# Patient Record
Sex: FEMALE | Race: WHITE
[De-identification: names, ages, dates, MRNs, and addresses within clinical notes are randomized per-mention and may not be internally consistent; named-entity substitution may affect disease eponyms.]

---

## 2019-06-02 ENCOUNTER — HOSPITAL ENCOUNTER (EMERGENCY)
Dept: HOSPITAL 91 - FTE | Age: 19
Discharge: HOME | End: 2019-06-02
Payer: SELF-PAY

## 2019-06-02 ENCOUNTER — HOSPITAL ENCOUNTER (EMERGENCY)
Dept: HOSPITAL 10 - FTE | Age: 19
Discharge: HOME | End: 2019-06-02
Payer: SELF-PAY

## 2019-06-02 VITALS
WEIGHT: 131.18 LBS | HEIGHT: 65 IN | BODY MASS INDEX: 21.85 KG/M2 | HEIGHT: 65 IN | WEIGHT: 131.18 LBS | BODY MASS INDEX: 21.85 KG/M2

## 2019-06-02 VITALS — SYSTOLIC BLOOD PRESSURE: 121 MMHG | RESPIRATION RATE: 18 BRPM | DIASTOLIC BLOOD PRESSURE: 80 MMHG | HEART RATE: 102 BPM

## 2019-06-02 DIAGNOSIS — O21.9: Primary | ICD-10-CM

## 2019-06-02 DIAGNOSIS — Z3A.12: ICD-10-CM

## 2019-06-02 LAB
ABNORMAL IP MESSAGE: 1
ADD MAN DIFF?: NO
ADD UMIC: YES
ANION GAP: 11 (ref 5–13)
BASOPHIL #: 0 10^3/UL (ref 0–0.1)
BASOPHILS %: 0.2 % (ref 0–2)
BLOOD UREA NITROGEN: 8 MG/DL (ref 7–20)
CALCIUM: 9.4 MG/DL (ref 8.4–10.2)
CARBON DIOXIDE: 25 MMOL/L (ref 21–31)
CHLORIDE: 102 MMOL/L (ref 97–110)
CREATININE: 0.61 MG/DL (ref 0.44–1)
EOSINOPHILS #: 0 10^3/UL (ref 0–0.5)
EOSINOPHILS %: 0 % (ref 0–7)
GLUCOSE: 85 MG/DL (ref 70–220)
HEMATOCRIT: 37.6 % (ref 37–47)
HEMOGLOBIN: 13 G/DL (ref 12–16)
IMMATURE GRANS #M: 0.02 10^3/UL (ref 0–0.03)
IMMATURE GRANS % (M): 0.4 % (ref 0–0.43)
LYMPHOCYTES #: 0.5 10^3/UL (ref 0.8–2.9)
LYMPHOCYTES %: 10 % (ref 18–55)
MEAN CORPUSCULAR HEMOGLOBIN: 29.1 PG (ref 29–33)
MEAN CORPUSCULAR HGB CONC: 34.6 G/DL (ref 32–37)
MEAN CORPUSCULAR VOLUME: 84.1 FL (ref 72–104)
MEAN PLATELET VOLUME: 9.6 FL (ref 7.4–10.4)
MONOCYTE #: 0.8 10^3/UL (ref 0.3–0.9)
MONOCYTES %: 13.9 % (ref 0–13)
NEUTROPHIL #: 4.1 10^3/UL (ref 1.6–7.5)
NEUTROPHILS %: 75.5 % (ref 30–74)
NUCLEATED RED BLOOD CELLS #: 0 10^3/UL (ref 0–0)
NUCLEATED RED BLOOD CELLS%: 0 /100WBC (ref 0–0)
PLATELET COUNT: 195 10^3/UL (ref 140–415)
POSITIVE DIFF: (no result)
POTASSIUM: 4 MMOL/L (ref 3.5–5.1)
RED BLOOD COUNT: 4.47 10^6/UL (ref 4.2–5.4)
RED CELL DISTRIBUTION WIDTH: 13.4 % (ref 11.5–14.5)
SODIUM: 138 MMOL/L (ref 135–144)
UR ASCORBIC ACID: NEGATIVE MG/DL
UR BACTERIA: (no result) /HPF
UR BILIRUBIN (DIP): NEGATIVE MG/DL
UR BLOOD (DIP): NEGATIVE MG/DL
UR CLARITY: (no result)
UR COLOR: (no result)
UR GLUCOSE (DIP): NEGATIVE MG/DL
UR KETONES (DIP): (no result) MG/DL
UR LEUKOCYTE ESTERASE (DIP): (no result) LEU/UL
UR MUCUS: (no result) /HPF
UR NITRITE (DIP): NEGATIVE MG/DL
UR NONSQUAMOUS EPITHELIAL CELL: 1 /HPF
UR PH (DIP): 6 (ref 5–9)
UR RBC: 0 /HPF (ref 0–5)
UR SPECIFIC GRAVITY (DIP): 1.03 (ref 1–1.03)
UR SQUAMOUS EPITHELIAL CELL: (no result) /HPF
UR TOTAL PROTEIN (DIP): (no result) MG/DL
UR UROBILINOGEN (DIP): (no result) MG/DL
UR WBC: 10 /HPF (ref 0–5)
WHITE BLOOD COUNT: 5.4 10^3/UL (ref 4.8–10.8)

## 2019-06-02 PROCEDURE — 85025 COMPLETE CBC W/AUTO DIFF WBC: CPT

## 2019-06-02 PROCEDURE — 80048 BASIC METABOLIC PNL TOTAL CA: CPT

## 2019-06-02 PROCEDURE — 99284 EMERGENCY DEPT VISIT MOD MDM: CPT

## 2019-06-02 PROCEDURE — 84702 CHORIONIC GONADOTROPIN TEST: CPT

## 2019-06-02 PROCEDURE — 81001 URINALYSIS AUTO W/SCOPE: CPT

## 2019-06-02 PROCEDURE — 96374 THER/PROPH/DIAG INJ IV PUSH: CPT

## 2019-06-02 PROCEDURE — 36415 COLL VENOUS BLD VENIPUNCTURE: CPT

## 2019-06-02 RX ADMIN — ONDANSETRON HYDROCHLORIDE 1 MLS/HR: 2 INJECTION, SOLUTION INTRAMUSCULAR; INTRAVENOUS at 10:01

## 2019-06-02 RX ADMIN — THIAMINE HYDROCHLORIDE 1 MLS/HR: 100 INJECTION, SOLUTION INTRAMUSCULAR; INTRAVENOUS at 10:00

## 2019-06-02 NOTE — ERD
ER Documentation


Chief Complaint


Chief Complaint





NAUSEA, VOMITING, ONSET 3 DAYS, PT 12 WKS PG





HPI


Pt is a 19-year-old female presenting for nausea/vomiting, headache, and low 


back pain for 3 days.  She reports she is 12 weeks pregnant has been following 


up with her OB/GYN doctor regularly.  Reports that she has not been able to eat 


anything for the last 3 days.  She has experienced this before and has been 


given a prescription for metoclopramide 10 mg with no relief of her symptoms 


this time.  She denies abdominal pain, fevers, diarrhea, burning sensation when 


urinating or increased urination.





ROS


All systems reviewed and are negative except as per history of present illness.





Medications


Home Meds


Active Scripts


Acetaminophen* (Tylophen*) 500 Mg Capsule, 1 CAP PO Q6H PRN for PAIN AND OR 


ELEVATED TEMP, #20 CAP


   Prov:VANNESA ALVAREZ PA-C         6/2/19


Ondansetron (Ondansetron Odt) 4 Mg Tab.rapdis, 4 MG PO Q6H PRN for NAUSEA AND/OR


VOMITING, #10 TAB


   Prov:VANNESA ALVAREZ PA-C         6/2/19





Allergies


Allergies:  


Coded Allergies:  


     No Known Allergy (Unverified , 6/2/19)





PMhx/Soc


Medical and Surgical Hx:  pt denies Medical Hx, pt denies Surgical Hx


Hx Alcohol Use:  No


Hx Substance Use:  No


Hx Tobacco Use:  No





FmHx


Family History:  diabetes, coronary disease





Physical Exam


Vitals





Vital Signs


  Date      Temp  Pulse  Resp  B/P (MAP)   Pulse Ox  O2          O2 Flow    FiO2


Time                                                 Delivery    Rate


    6/2/19  98.3    102    18      121/80        98


     08:58                           (94)





Physical Exam


Const:   Appears fatigued, dehydrated


Head:   Atraumatic 


Eyes:    Normal Conjunctiva


ENT:    Pink, dry mucosa


Neck:               Full range of motion. No meningismus.


Resp:   Clear to auscultation bilaterally


Cardio:   Slightly tachycardic, no murmurs


Abd:    Soft, non tender. Normal bowel sounds


Back:   Nonspecific tenderness across lower back


Ext:    No cyanosis, or edema


Neur:   Awake and alert


Psych:    Normal Mood and Affect


Result Diagram:  


6/2/19 1003                                                                     


          6/2/19 1003





Results 24 hrs





Laboratory Tests


     Test
                                   6/2/19
09:56     6/2/19
10:03


     Urine Color                          STACIE


     Urine Clarity
                       SLIGHTLY
CLOUDY  



     Urine pH                                        6.0


     Urine Specific Gravity                        1.027


     Urine Ketones                              2+ mg/dL


     Urine Nitrite                        NEGATIVE mg/dL


     Urine Bilirubin                      NEGATIVE mg/dL


     Urine Urobilinogen                         2+ mg/dL


     Urine Leukocyte Esterase             TRACE Cade/ul


     Urine Microscopic RBC                        0 /HPF


     Urine Microscopic WBC                       10 /HPF


     Urine Squamous Epithelial
Cells      FEW /HPF 
       



     Urine Bacteria                       FEW /HPF


     Urine Mucus                          MANY /HPF


     Urine Hemoglobin                     NEGATIVE mg/dL


     Urine Glucose                        NEGATIVE mg/dL


     Urine Total Protein                        1+ mg/dl


     White Blood Count                                        5.4 10^3/ul


     Red Blood Count                                         4.47 10^6/ul


     Hemoglobin                                                 13.0 g/dl


     Hematocrit                                                    37.6 %


     Mean Corpuscular Volume                                      84.1 fl


     Mean Corpuscular Hemoglobin                                  29.1 pg


     Mean Corpuscular Hemoglobin
Concent  
                    34.6 g/dl 



     Red Cell Distribution Width                                   13.4 %


     Platelet Count                                           195 10^3/UL


     Mean Platelet Volume                                          9.6 fl


     Immature Granulocytes %                                      0.400 %


     Neutrophils %                                                 75.5 %


     Lymphocytes %                                                 10.0 %


     Monocytes %                                                   13.9 %


     Eosinophils %                                                  0.0 %


     Basophils %                                                    0.2 %


     Nucleated Red Blood Cells %                              0.0 /100WBC


     Immature Granulocytes #                                0.020 10^3/ul


     Neutrophils #                                            4.1 10^3/ul


     Lymphocytes #                                            0.5 10^3/ul


     Monocytes #                                              0.8 10^3/ul


     Eosinophils #                                            0.0 10^3/ul


     Basophils #                                              0.0 10^3/ul


     Nucleated Red Blood Cells #                              0.0 10^3/ul


     Sodium Level                                              138 mmol/L


     Potassium Level                                           4.0 mmol/L


     Chloride Level                                            102 mmol/L


     Carbon Dioxide Level                                       25 mmol/L


     Anion Gap                                                         11


     Blood Urea Nitrogen                                          8 mg/dl


     Creatinine                                                0.61 mg/dl


     Est Glomerular Filtrat Rate
mL/min   
                > 60 mL/min 



     Glucose Level                                               85 mg/dl


     Calcium Level                                              9.4 mg/dl


     Beta HCG, Quantitative
              
                694495.0
mIU/ml





Current Medications


 Medications
   Dose
          Sig/[place]
       Start Time
   Status  Last


 (Trade)       Ordered        Route
 PRN     Stop Time              Admin
Dose


                              Reason                                Admin


 Sodium         1,000 ml @ 
   Q1H STAT
      6/2/19        DC            6/2/19


Chloride       1,000 mls/hr   IV
            09:49
 6/2/19                10:00



                                             10:48


 Ondansetron    54 ml @ 
      ONCE  STAT
    6/2/19        DC            6/2/19


HCl 8
         200 mls/hr     IV
            09:49
 6/2/19                10:01



mg/Dextrose                                  10:05


 Ondansetron    4 mg           STK-MED        6/2/19        DC       



HCl
  (Zofran                 ONCE
 .ROUTE
  09:59
 6/2/19


Inj)                                         10:00








Procedures/MDM


ED COURSE:


The patient was stable throughout ED course. I kept the patient and family 


informed of laboratory and diagnostic imaging results throughout the ED course. 











MEDICATIONS GIVEN: 


IV fluids and Zofran


Patient tolerated medication well with no adverse reactions. Patient reported 


improvement in pain. 











MEDICAL DECISION MAKING:


Patient is a 19-year-old female pregnant for 12 weeks.  She reports she is been 


having morning sickness the past month.  Daughter has previously prescribed her 


metoclopramide 10 mg which has previously helped however it is not helping 


currently with her symptoms of nausea and vomiting.  She denies abdominal pain 


at this time.  Abdomen is soft at discharge. H&P and other data not c/w emergent


process. Low suspicion for coronary syndrome, AAA, mesenteric ischemia, lower 


lobe pneumonia, DKA, bowel perforation, cholecystitis, choledocholithiasis, 


ascending cholangitis, hepatic abscess, pancreatitis, PUD, gastritis, GERD, 


splenic rupture, diverticulitis,nephrolithiasis, appendicitis, constipation.  


After receiving IV fluids and Zofran she states that she is feeling much better 


and was ready for discharge.





Vital signs were reviewed. Patient is afebrile. Patient was not hypoxic. Patient


was hemodynamically stable. 








PRESCRIPTION: Tylenol and Zofran





DISCHARGE:


At this time, patient is stable for discharge and outpatient management. I have 


instructed the patient to follow-up with his/her primary care physician in 1-2 


days. I have discussed with the patient the possibility of needing to see a 


specialist for further workup and imaging studies if symptoms persist. I have 


instructed the patient to promptly return to the ER for any new or worsening 


symptoms including increased pain, fever, nausea, vomiting, weakness or LOC. The


patient and/or family expressed understanding of and agreement with this plan. 


All questions were answered. Home care instructions were provided. 





Disclaimer: Inadvertent spelling and grammatical errors are likely due to 


EHR/dictation software use and do not reflect on the overall quality of patient 


care. Also, please note that the electronic time recorded on this note does not 


necessarily reflect the actual time of the patient encounter.





Departure


Condition:  VANNESA Yeager PA-C            Jun 2, 2019 10:53